# Patient Record
Sex: MALE | Race: WHITE | Employment: OTHER | URBAN - METROPOLITAN AREA
[De-identification: names, ages, dates, MRNs, and addresses within clinical notes are randomized per-mention and may not be internally consistent; named-entity substitution may affect disease eponyms.]

---

## 2018-10-15 ENCOUNTER — HOSPITAL ENCOUNTER (OUTPATIENT)
Dept: PREADMISSION TESTING | Age: 68
Discharge: HOME | End: 2018-10-15
Payer: MEDICARE

## 2018-10-15 LAB
ANION GAP SERPL CALC-SCNC: 9 MMOL/L (ref 3–18)
BUN SERPL-MCNC: 15 MG/DL (ref 7–18)
BUN/CREAT SERPL: 13 (ref 12–20)
CALCIUM SERPL-MCNC: 9.5 MG/DL (ref 8.5–10.1)
CHLORIDE SERPL-SCNC: 104 MMOL/L (ref 100–108)
CO2 SERPL-SCNC: 28 MMOL/L (ref 21–32)
CREAT SERPL-MCNC: 1.12 MG/DL (ref 0.6–1.3)
GLUCOSE SERPL-MCNC: 98 MG/DL (ref 74–99)
HCT VFR BLD AUTO: 43.1 % (ref 36–48)
HGB BLD-MCNC: 14.9 G/DL (ref 13–16)
POTASSIUM SERPL-SCNC: 4.3 MMOL/L (ref 3.5–5.5)
SODIUM SERPL-SCNC: 141 MMOL/L (ref 136–145)

## 2018-10-15 PROCEDURE — 36415 COLL VENOUS BLD VENIPUNCTURE: CPT | Performed by: SURGERY

## 2018-10-15 PROCEDURE — 85018 HEMOGLOBIN: CPT | Performed by: SURGERY

## 2018-10-15 PROCEDURE — 80048 BASIC METABOLIC PNL TOTAL CA: CPT | Performed by: SURGERY

## 2018-10-15 PROCEDURE — 93005 ELECTROCARDIOGRAM TRACING: CPT

## 2018-10-16 LAB
ATRIAL RATE: 63 BPM
CALCULATED P AXIS, ECG09: 73 DEGREES
CALCULATED R AXIS, ECG10: 54 DEGREES
CALCULATED T AXIS, ECG11: 35 DEGREES
DIAGNOSIS, 93000: NORMAL
P-R INTERVAL, ECG05: 142 MS
Q-T INTERVAL, ECG07: 398 MS
QRS DURATION, ECG06: 96 MS
QTC CALCULATION (BEZET), ECG08: 407 MS
VENTRICULAR RATE, ECG03: 63 BPM

## 2018-10-24 ENCOUNTER — ANESTHESIA EVENT (OUTPATIENT)
Dept: SURGERY | Age: 68
End: 2018-10-24
Payer: MEDICARE

## 2018-10-24 NOTE — H&P
PATIENT: Maddie Oglesby YOB: 1950 DATE: 08/13/2018 8:15 AM  
VISIT TYPE: Office Visit 
_____________________________________________________________ Completed Orders (this encounter) Order Interpretation Result Next Lab Date  
surgery instructions given education Assessment/Plan # Detail Type Description 1. Assessment Localized swelling, mass and lump, trunk (R22.2). Impression benign lipoma right back, discussed excision in OR, he will need a post op drain which would stay in place about a week. Patient Plan excision right back lipoma 2. Other Orders Orders not associated to today's assessments. Plan Orders Basic Metabolic Panel (8) AU to be performed and Hemoglobin to be performed. Plan Orders Further diagnostic evaluations ordered today include(s) ELECTROCARDIOGRAM, COMPLETE to be performed. This 70year old male presents for Lipoma. History of Present Illness: 1. Lipoma The swelling occurred 7 months ago and is constant. The severity is moderate and has worsened. The patient denies any history of trauma. Previous diagnostic studies and/or treatments that have been performed/administered include. Previous diagnostic studies and/or treatments that have not been performed/administered include a biopsy, an excision and radiographs. The patient denies any aggravating factors. Interventions the patient has tried have not provided any relief. The patient denies any bleeding, chest pain, dyspnea, fever, headache, myalgia, numbness and weight loss. Additional information:  he had lipoma removed from back years ago. Irvin Santos PROBLEM LIST:    
Problem Description Onset Date Chronic Clinical Status Notes Hypertriglyceridaemia  Y Vitamin D deficiency  Y    
 
 
 
PAST MEDICAL/SURGICAL HISTORY  (Detailed) Disease/disorder Onset Date Management Date Comments  
left patellar fracture  ORIF    
sebaceous cyst  excised Family History  (Detailed) Relationship Family Member Name  Age at Death Condition Onset Age Cause of Death Cousin    Cancer, breast  N Father    Hypertension  N Father    Alcoholism  N Father    Diabetes mellitus  N Mother    Dementia  N Social History:  (Detailed) Tobacco use reviewed. Preferred language is Eloy Elders. MARITAL STATUS/FAMILY/SOCIAL SUPPORT Currently . CHILDREN Has children:  2 daughter(s). Tobacco use status: Ex-cigarette smoker. Smoking status: Former smoker. SMOKING STATUS Use Status Type Smoking Status Usage Per Day Years Used Total Pack Years  
yes Cigarette Former smoker 1 Packs . 30.00 ALCOHOL There is a history of alcohol use. Type: Beer. 1 consumed 3 times per week. LIFESTYLE Exercise includes golf and walking. Medication Reconciliation Medications reconciled today. Patient is on no medications. Allergies: 
Ingredient Reaction (Severity) Medication Name Comment PENICILLINS Reviewed, no changes. Review of Systems System Neg/Pos Details Constitutional Negative Fever, Night sweats and Weight loss. ENMT Negative Hearing loss, Tinnitus, Vertigo and Voice change. Eyes Negative Diplopia and Vision loss. Respiratory Negative Asthma, Cough, Dyspnea, Hemoptysis, Known TB exposure and Wheezing. Cardio Negative Chest pain, Claudication, Edema, Irregular heartbeat/palpitations and Thrombophlebitis. GI Positive Bloating. GI Negative Dysphagia, Hemorrhoids, Jaundice and Reflux.  Negative Dysuria, Nocturia, Passage stone/gravel and Urinary incontinence. Endocrine Negative Cold intolerance and Goiter. Neuro Negative Focal weakness, Headache, Numbness, Paresthesia, Seizures and Syncope. Integumentary Negative Change in shape/size of mole(s) and Skin lesion. MS Negative Back pain, Bone/joint symptoms, Muscle weakness and Myalgia. Hema/Lymph Negative Easy bleeding and Easy bruising. Allergic/Immuno Positive Contact allergy. Allergic/Immuno Negative Contact dermatitis. Vital Signs Height Time ft in cm Last Measured Height Position 8:46 AM 5.0 7.50 171.45 08/13/2018 Standing Weight/BSA/BMI Time lb oz kg Context BMI kg/m2 BSA m2  
8:46 .00  92.079 dressed with shoes 31.32 Blood Pressure Time BP mm/Hg Position Side Site Method Cuff Size 8:46 /78 sitting right arm automatic adult large Temperature/Pulse/Respiration Time Temp F Temp C Temp Site Pulse/min Pattern Resp/ min 8:46 AM 97.90 36.61 ear 69 regular Measured By Time Measured by  
8:46 AM Bobby Antonio Physical Exam: 
Exam Findings Details Back/Spine Comments R upper back laterally 8 cm soft subq mass Constitutional Normal No acute distress. Well nourished. Well developed. Eyes Normal General - Right: Normal, Left: Normal. Sclera - Right: Normal, Left: Normal.  
Neck Exam Normal Inspection - Normal.  
Respiratory Normal Cough - Absent. Effort - Normal.  
Cardiovascular Normal Inspection - JVD: Absent. Heart rate - Regular rate. Abdomen * Umbilicus - herniated. Abdomen Normal Patient is not obese. Skin * Inspection - General inspection: warm and dry. Musculoskeletal Normal Visual overview of all four extremities is normal. Gait - Normal.  
Extremity Normal No Cyanosis. No Edema. Neurological Normal Level of consciousness - Normal. Orientation - Normal. Memory - Normal.  
Psychiatric Normal Orientation - Oriented to time, place, person & situation. No agitation. Not anxious. Appropriate mood and affect. Patient Education # Patient Education 1. Lipoma: Care Instructions To Be Scheduled / Ordered: 
Status Order Reason Assessment Timeframe Appointment  
ordered ELECTROCARDIOGRAM, COMPLETE  Z01.810    
ordered Basic Metabolic Panel (8) AU  D16.496    
ordered Hemoglobin  Z01.812 Lab Studies: 
Status Lab Study Schedule Timeframe Schedule Date Comments Interpretation Value ordered Basic Metabolic Panel (8) AU ordered Hemoglobin Active Patient Care Team Members Name Contact Agency Type Support Role Relationship Active Date Inactive Date Specialty Gaviota Piper   Patient provider PCP   Family Practice Provider: Oumar Arriaga 08/13/2018 2:20 PM  
Document generated by:  Joseph Sorto 08/13/2018 02:20 PM  
 
 
 
 
 
 
 
 
 
 
 
 
Electronically signed by Joseph Sorto MD on 08/15/2018 10:14 AM

## 2018-10-25 ENCOUNTER — ANESTHESIA (OUTPATIENT)
Dept: SURGERY | Age: 68
End: 2018-10-25
Payer: MEDICARE

## 2018-10-25 ENCOUNTER — HOSPITAL ENCOUNTER (OUTPATIENT)
Age: 68
Setting detail: OUTPATIENT SURGERY
Discharge: HOME | End: 2018-10-25
Attending: SURGERY | Admitting: SURGERY
Payer: MEDICARE

## 2018-10-25 VITALS
OXYGEN SATURATION: 98 % | HEIGHT: 68 IN | BODY MASS INDEX: 30.62 KG/M2 | HEART RATE: 61 BPM | SYSTOLIC BLOOD PRESSURE: 131 MMHG | WEIGHT: 202.06 LBS | TEMPERATURE: 98 F | DIASTOLIC BLOOD PRESSURE: 80 MMHG | RESPIRATION RATE: 14 BRPM

## 2018-10-25 DIAGNOSIS — D17.1 LIPOMA OF TORSO: Primary | ICD-10-CM

## 2018-10-25 PROCEDURE — 88304 TISSUE EXAM BY PATHOLOGIST: CPT | Performed by: SURGERY

## 2018-10-25 PROCEDURE — 77030020782 HC GWN BAIR PAWS FLX 3M -B: Performed by: SURGERY

## 2018-10-25 PROCEDURE — 77030013567 HC DRN WND RESERV BARD -A: Performed by: SURGERY

## 2018-10-25 PROCEDURE — 77030011267 HC ELECTRD BLD COVD -A: Performed by: SURGERY

## 2018-10-25 PROCEDURE — 76060000032 HC ANESTHESIA 0.5 TO 1 HR: Performed by: SURGERY

## 2018-10-25 PROCEDURE — 77030012407 HC DRN WND BARD -B: Performed by: SURGERY

## 2018-10-25 PROCEDURE — 74011250636 HC RX REV CODE- 250/636

## 2018-10-25 PROCEDURE — 74011250636 HC RX REV CODE- 250/636: Performed by: SURGERY

## 2018-10-25 PROCEDURE — 77030032490 HC SLV COMPR SCD KNE COVD -B: Performed by: SURGERY

## 2018-10-25 PROCEDURE — 76210000021 HC REC RM PH II 0.5 TO 1 HR: Performed by: SURGERY

## 2018-10-25 PROCEDURE — 74011000250 HC RX REV CODE- 250: Performed by: SURGERY

## 2018-10-25 PROCEDURE — 77030002933 HC SUT MCRYL J&J -A: Performed by: SURGERY

## 2018-10-25 PROCEDURE — 77030038020 HC MANFLD NEPTUNE STRY -B: Performed by: SURGERY

## 2018-10-25 PROCEDURE — 76010000138 HC OR TIME 0.5 TO 1 HR: Performed by: SURGERY

## 2018-10-25 PROCEDURE — 77030008556 HC TBNG SMK EVAC COVD -A: Performed by: SURGERY

## 2018-10-25 RX ORDER — LIDOCAINE HYDROCHLORIDE 20 MG/ML
INJECTION, SOLUTION EPIDURAL; INFILTRATION; INTRACAUDAL; PERINEURAL AS NEEDED
Status: DISCONTINUED | OUTPATIENT
Start: 2018-10-25 | End: 2018-10-25 | Stop reason: HOSPADM

## 2018-10-25 RX ORDER — SODIUM CHLORIDE, SODIUM LACTATE, POTASSIUM CHLORIDE, CALCIUM CHLORIDE 600; 310; 30; 20 MG/100ML; MG/100ML; MG/100ML; MG/100ML
125 INJECTION, SOLUTION INTRAVENOUS CONTINUOUS
Status: DISCONTINUED | OUTPATIENT
Start: 2018-10-25 | End: 2018-10-25 | Stop reason: HOSPADM

## 2018-10-25 RX ORDER — MIDAZOLAM HYDROCHLORIDE 1 MG/ML
INJECTION, SOLUTION INTRAMUSCULAR; INTRAVENOUS AS NEEDED
Status: DISCONTINUED | OUTPATIENT
Start: 2018-10-25 | End: 2018-10-25 | Stop reason: HOSPADM

## 2018-10-25 RX ORDER — HYDROCODONE BITARTRATE AND ACETAMINOPHEN 5; 300 MG/1; MG/1
1 TABLET ORAL
Qty: 30 TAB | Refills: 0 | Status: SHIPPED | OUTPATIENT
Start: 2018-10-25

## 2018-10-25 RX ORDER — SODIUM CHLORIDE 0.9 % (FLUSH) 0.9 %
5-10 SYRINGE (ML) INJECTION AS NEEDED
Status: DISCONTINUED | OUTPATIENT
Start: 2018-10-25 | End: 2018-10-25 | Stop reason: HOSPADM

## 2018-10-25 RX ORDER — BUPIVACAINE HYDROCHLORIDE 5 MG/ML
INJECTION, SOLUTION EPIDURAL; INTRACAUDAL AS NEEDED
Status: DISCONTINUED | OUTPATIENT
Start: 2018-10-25 | End: 2018-10-25 | Stop reason: HOSPADM

## 2018-10-25 RX ORDER — PROPOFOL 10 MG/ML
INJECTION, EMULSION INTRAVENOUS AS NEEDED
Status: DISCONTINUED | OUTPATIENT
Start: 2018-10-25 | End: 2018-10-25 | Stop reason: HOSPADM

## 2018-10-25 RX ORDER — FENTANYL CITRATE 50 UG/ML
INJECTION, SOLUTION INTRAMUSCULAR; INTRAVENOUS AS NEEDED
Status: DISCONTINUED | OUTPATIENT
Start: 2018-10-25 | End: 2018-10-25 | Stop reason: HOSPADM

## 2018-10-25 RX ADMIN — LIDOCAINE HYDROCHLORIDE 80 MG: 20 INJECTION, SOLUTION EPIDURAL; INFILTRATION; INTRACAUDAL; PERINEURAL at 10:35

## 2018-10-25 RX ADMIN — PROPOFOL 10 MG: 10 INJECTION, EMULSION INTRAVENOUS at 10:42

## 2018-10-25 RX ADMIN — SODIUM CHLORIDE, SODIUM LACTATE, POTASSIUM CHLORIDE, AND CALCIUM CHLORIDE 125 ML/HR: 600; 310; 30; 20 INJECTION, SOLUTION INTRAVENOUS at 09:18

## 2018-10-25 RX ADMIN — PROPOFOL 30 MG: 10 INJECTION, EMULSION INTRAVENOUS at 10:37

## 2018-10-25 RX ADMIN — MIDAZOLAM HYDROCHLORIDE 2 MG: 1 INJECTION, SOLUTION INTRAMUSCULAR; INTRAVENOUS at 10:28

## 2018-10-25 RX ADMIN — PROPOFOL 10 MG: 10 INJECTION, EMULSION INTRAVENOUS at 10:40

## 2018-10-25 RX ADMIN — FENTANYL CITRATE 100 MCG: 50 INJECTION, SOLUTION INTRAMUSCULAR; INTRAVENOUS at 10:28

## 2018-10-25 RX ADMIN — PROPOFOL 20 MG: 10 INJECTION, EMULSION INTRAVENOUS at 10:38

## 2018-10-25 RX ADMIN — PROPOFOL 10 MG: 10 INJECTION, EMULSION INTRAVENOUS at 10:45

## 2018-10-25 NOTE — ANESTHESIA PREPROCEDURE EVALUATION
Anesthetic History No history of anesthetic complications Review of Systems / Medical History Patient summary reviewed, nursing notes reviewed and pertinent labs reviewed Pulmonary Within defined limits Neuro/Psych Within defined limits Cardiovascular Exercise tolerance: >4 METS 
  
GI/Hepatic/Renal 
Within defined limits Endo/Other Arthritis Other Findings Physical Exam 
 
Airway Mallampati: III 
TM Distance: 4 - 6 cm Neck ROM: decreased range of motion Cardiovascular Regular rate and rhythm,  S1 and S2 normal,  no murmur, click, rub, or gallop Rhythm: regular Rate: normal 
 
 
 
 Dental 
 
Dentition: Caps/crowns Pulmonary Breath sounds clear to auscultation Abdominal 
GI exam deferred Other Findings Anesthetic Plan ASA: 1 Anesthesia type: MAC Anesthetic plan and risks discussed with: Patient and Spouse

## 2018-10-25 NOTE — PERIOP NOTES
Primary Nurse Nate León RN and Kena Sanchez RN performed a dual skin assessment on this patient Reviewed PTA medication list with patient/caregiver and patient/caregiver denies any additional medications.  Patient admits to having a responsible adult care for them for at least 24 hours after surgery.

## 2018-10-25 NOTE — ANESTHESIA POSTPROCEDURE EVALUATION
Post-Anesthesia Evaluation and Assessment Cardiovascular Function/Vital Signs Visit Vitals /80 Pulse 61 Temp 36.6 °C (97.9 °F) Resp 14 Ht 5' 7.5\" (1.715 m) Wt 91.7 kg (202 lb 1 oz) SpO2 98% BMI 31.18 kg/m² Patient is status post Procedure(s): EXCISION RIGHT UPPER BACK MASS. Nausea/Vomiting: Controlled. Postoperative hydration reviewed and adequate. Pain: 
Pain Scale 1: Numeric (0 - 10) (10/25/18 1115) Pain Intensity 1: 0 (10/25/18 1115) Managed. Neurological Status:  
Neuro (WDL): (alert, drowsy) (10/25/18 1115) At baseline. Mental Status and Level of Consciousness: Baseline and stable. Pulmonary Status:  
O2 Device: Room air (10/25/18 1115) Adequate oxygenation and airway patent. Complications related to anesthesia: None Post-anesthesia assessment completed. No concerns. Patient has met all discharge requirements.  
 
Signed By: Giorgio Bedolla MD

## 2018-10-25 NOTE — OP NOTES
[facility]  OPERATIVE REPORT    Judie López  MR#: [id number]  : 1950  ACCOUNT #: [de-identified]   DATE OF SERVICE: 10/25/2018    PREOPERATIVE DIAGNOSIS:  Right upper back mass measuring 15 cm. POSTOPERATIVE DIAGNOSIS:  Right upper back mass measuring 15 cm. PROCEDURE PERFORMED:  Excision of right back mass 15 cm. SURGEON:  Shaka Godfrey MD    ANESTHESIA:  MAC.    INDICATION FOR PROCEDURE:  A 65-year-old male with a mass in the left upper lateral back that has gotten larger and he was brought to the operating room for excision. DESCRIPTION OF THE PROCEDURE:  The patient was brought to the operating room, placed on the table in the left lateral decubitus position. After placing monitors and adequate intravenous sedation, the right upper lateral back was prepped and draped in the usual sterile fashion. Marcaine was injected locally in the skin and subcutaneous tissue. A slightly oblique incision was made over the mass, through the skin, down to subcutaneous tissue. Then, using electrocautery dissection, a lipomatous mass was excised from the surrounding tissue. It was sent to Pathology in formalin. There was good hemostasis with electrocautery. A 15-Belarusian fluted drain was placed through a separate stab incision and secured to the skin with a nylon suture. Subcutaneous tissue was reapproximated with interrupted 3-0 Monocryl suture and 4-0 Monocryl was used to reapproximate the skin. Steri-Strips were applied and the wounds were dressed sterilely. The sponge, instrument, and needle count were correct at the end of the procedure. ESTIMATED BLOOD LOSS:  5 mL. SPECIMENS REMOVED:  Lipoma. DRAINS:  A 15-Belarusian fluted John drain. IMPLANTS:  No other implants. COMPLICATIONS:  No complications. ASSISTANT:  MD Saul Domingo / Shameka Prather  D: 10/25/2018 10:57     T: 10/25/2018 11:58  JOB #: [id number]

## 2018-10-25 NOTE — PERIOP NOTES
Reviewed discharge plan of care with patient and his SO, they verbalized understanding. Reviewed care and emptying of RONNIE , recording sheet provided to take to follow up,. They also verbalized understanding

## 2018-10-25 NOTE — DISCHARGE INSTRUCTIONS
Empty drain and record as instructed prior to discharge. Keep dressing dry until follow up and drain is removed    DISCHARGE SUMMARY from Nurse    PATIENT INSTRUCTIONS:    After general anesthesia or intravenous sedation, for 24 hours or while taking prescription Narcotics:  · Limit your activities  · Do not drive and operate hazardous machinery  · Do not make important personal or business decisions  · Do  not drink alcoholic beverages  · If you have not urinated within 8 hours after discharge, please contact your surgeon on call. Report the following to your surgeon:  · Excessive pain, swelling, redness or odor of or around the surgical area  · Temperature over 100.5  · Nausea and vomiting lasting longer than 4 hours or if unable to take medications  · Any signs of decreased circulation or nerve impairment to extremity: change in color, persistent  numbness, tingling, coldness or increase pain  · Any questions    What to do at Home:  Recommended activity: Activity as tolerated and no driving for today,     *  Please give a list of your current medications to your Primary Care Provider. *  Please update this list whenever your medications are discontinued, doses are      changed, or new medications (including over-the-counter products) are added. *  Please carry medication information at all times in case of emergency situations. These are general instructions for a healthy lifestyle:    No smoking/ No tobacco products/ Avoid exposure to second hand smoke  Surgeon General's Warning:  Quitting smoking now greatly reduces serious risk to your health.     Obesity, smoking, and sedentary lifestyle greatly increases your risk for illness    A healthy diet, regular physical exercise & weight monitoring are important for maintaining a healthy lifestyle    You may be retaining fluid if you have a history of heart failure or if you experience any of the following symptoms:  Weight gain of 3 pounds or more overnight or 5 pounds in a week, increased swelling in our hands or feet or shortness of breath while lying flat in bed. Please call your doctor as soon as you notice any of these symptoms; do not wait until your next office visit. Recognize signs and symptoms of STROKE:    F-face looks uneven    A-arms unable to move or move unevenly    S-speech slurred or non-existent    T-time-call 911 as soon as signs and symptoms begin-DO NOT go       Back to bed or wait to see if you get better-TIME IS BRAIN. Warning Signs of HEART ATTACK     Call 911 if you have these symptoms:   Chest discomfort. Most heart attacks involve discomfort in the center of the chest that lasts more than a few minutes, or that goes away and comes back. It can feel like uncomfortable pressure, squeezing, fullness, or pain.  Discomfort in other areas of the upper body. Symptoms can include pain or discomfort in one or both arms, the back, neck, jaw, or stomach.  Shortness of breath with or without chest discomfort.  Other signs may include breaking out in a cold sweat, nausea, or lightheadedness. Don't wait more than five minutes to call 911 - MINUTES MATTER! Fast action can save your life. Calling 911 is almost always the fastest way to get lifesaving treatment. Emergency Medical Services staff can begin treatment when they arrive -- up to an hour sooner than if someone gets to the hospital by car. The discharge information has been reviewed with the patient and caregiver. The patient and caregiver verbalized understanding. Discharge medications reviewed with the patient and caregiver and appropriate educational materials and side effects teaching were provided.   ___________________________________________________________________________________________________________________________________

## 2021-01-01 NOTE — INTERVAL H&P NOTE
H&P Update: 
Justin Kumar was seen and examined. History and physical has been reviewed. The patient has been examined. There have been no significant clinical changes since the completion of the originally dated History and Physical. 
Patient identified by surgeon; surgical site was confirmed by patient and surgeon.  
 
Signed By: Raghu Oviedo MD   
 October 25, 2018 9:46 AM   
 

Female

## (undated) DEVICE — MINOR: Brand: MEDLINE INDUSTRIES, INC.

## (undated) DEVICE — 4-PORT MANIFOLD: Brand: NEPTUNE 2

## (undated) DEVICE — SUT MONOCRYL PLUS UD 4-0 --

## (undated) DEVICE — SPONGE GZ W4XL4IN COT 12 PLY TYP VII WVN C FLD DSGN

## (undated) DEVICE — ATTACHMENT SMK EVAC FOR ES PNCL ACCUVAC

## (undated) DEVICE — Device

## (undated) DEVICE — GOWN,AURORA,NONRNF,XL,30/CS: Brand: MEDLINE

## (undated) DEVICE — DRAIN SURG 15FR L3/16IN SIL RND 3/4 FLUT 3/16IN TRCR

## (undated) DEVICE — STERILE POLYISOPRENE POWDER-FREE SURGICAL GLOVES WITH EMOLLIENT COATING: Brand: PROTEXIS

## (undated) DEVICE — INSULATED BLADE ELECTRODE: Brand: EDGE

## (undated) DEVICE — KENDALL SCD EXPRESS SLEEVES, KNEE LENGTH, MEDIUM: Brand: KENDALL SCD

## (undated) DEVICE — (D)PREP SKN CHLRAPRP APPL 26ML -- CONVERT TO ITEM 371833